# Patient Record
Sex: FEMALE | Race: BLACK OR AFRICAN AMERICAN | NOT HISPANIC OR LATINO | Employment: FULL TIME | URBAN - METROPOLITAN AREA
[De-identification: names, ages, dates, MRNs, and addresses within clinical notes are randomized per-mention and may not be internally consistent; named-entity substitution may affect disease eponyms.]

---

## 2019-05-06 ENCOUNTER — HOSPITAL ENCOUNTER (EMERGENCY)
Facility: HOSPITAL | Age: 29
Discharge: HOME | End: 2019-05-06
Attending: EMERGENCY MEDICINE
Payer: MEDICAID

## 2019-05-06 VITALS
WEIGHT: 200 LBS | BODY MASS INDEX: 32.14 KG/M2 | HEART RATE: 81 BPM | SYSTOLIC BLOOD PRESSURE: 131 MMHG | DIASTOLIC BLOOD PRESSURE: 77 MMHG | RESPIRATION RATE: 18 BRPM | TEMPERATURE: 99 F | OXYGEN SATURATION: 99 % | HEIGHT: 66 IN

## 2019-05-06 DIAGNOSIS — L30.9 DERMATITIS: Primary | ICD-10-CM

## 2019-05-06 LAB
B-HCG UR QL: NEGATIVE
CTP QC/QA: YES

## 2019-05-06 PROCEDURE — 81025 URINE PREGNANCY TEST: CPT | Mod: ER | Performed by: EMERGENCY MEDICINE

## 2019-05-06 PROCEDURE — 99284 EMERGENCY DEPT VISIT MOD MDM: CPT | Mod: ER

## 2019-05-06 RX ORDER — TRIAMCINOLONE ACETONIDE 1 MG/G
CREAM TOPICAL 2 TIMES DAILY
Qty: 15 G | Refills: 0 | Status: SHIPPED | OUTPATIENT
Start: 2019-05-06 | End: 2019-05-13

## 2019-05-06 RX ORDER — PREDNISONE 20 MG/1
40 TABLET ORAL DAILY
Qty: 10 TABLET | Refills: 0 | Status: SHIPPED | OUTPATIENT
Start: 2019-05-06 | End: 2019-05-11

## 2019-05-06 RX ORDER — CLOTRIMAZOLE 1 %
CREAM (GRAM) TOPICAL 2 TIMES DAILY
Qty: 15 G | Refills: 0 | Status: SHIPPED | OUTPATIENT
Start: 2019-05-06 | End: 2019-05-13

## 2019-05-07 NOTE — ED PROVIDER NOTES
Encounter Date: 5/6/2019       History     Chief Complaint   Patient presents with    Rash     pt c/o rash 2 days ago that is generalized. pt unsure of what she may have come into contact with. states no relief with otc meds.      29yo F with no significant pmh on file with chief complaint pruritic rash to medial thighs, to left axilla x3 days.  No fever.  No pain. She admits to temporary relief with topical and p.o. Benadryl.  Denies history of rash. Denies any trauma or wound.  No medication changes.  No new exposures.  Symptoms are acute, constant, severity 5/10.        Review of patient's allergies indicates:  No Known Allergies  History reviewed. No pertinent past medical history.  Past Surgical History:   Procedure Laterality Date    KNEE SURGERY Left      History reviewed. No pertinent family history.  Social History     Tobacco Use    Smoking status: Never Smoker   Substance Use Topics    Alcohol use: Yes    Drug use: Never     Review of Systems   Constitutional: Negative for chills and fever.   HENT: Negative for sore throat.    Eyes: Negative.  Negative for discharge and redness.   Respiratory: Negative for chest tightness and shortness of breath.    Cardiovascular: Negative for chest pain.   Gastrointestinal: Negative for abdominal pain, nausea and vomiting.   Endocrine: Negative.    Genitourinary: Negative for dysuria and flank pain.   Musculoskeletal: Negative for back pain, neck pain and neck stiffness.   Skin: Positive for rash. Negative for color change and wound.   Neurological: Negative for dizziness, weakness, light-headedness and headaches.   Hematological: Does not bruise/bleed easily.   Psychiatric/Behavioral: Negative.    All other systems reviewed and are negative.      Physical Exam     Initial Vitals [05/06/19 1828]   BP Pulse Resp Temp SpO2   128/76 79 18 98.5 °F (36.9 °C) 100 %      MAP       --         Physical Exam    Nursing note and vitals reviewed.  Constitutional: She appears  well-developed and well-nourished. She is not diaphoretic. No distress.   Well-appearing and nontoxic.  Resting comfortably on exam table.   HENT:   Head: Normocephalic and atraumatic.   Eyes: Conjunctivae and EOM are normal. Pupils are equal, round, and reactive to light.   Neck: Normal range of motion. Neck supple. No tracheal deviation present.   Cardiovascular: Intact distal pulses.   Pulmonary/Chest: Breath sounds normal. No stridor. No respiratory distress.   Abdominal: Soft. Bowel sounds are normal. She exhibits no distension. There is no tenderness.   Musculoskeletal: Normal range of motion.   Lymphadenopathy:     She has no cervical adenopathy.   Neurological: She is alert and oriented to person, place, and time.   Skin: Skin is warm and dry. Capillary refill takes less than 2 seconds.   Medial, proximal thighs with slightly erythematous, macular rash. No ttp. No vesicular lesions. There is no significant central clearance, there is no crusting to wound edges. No warmth. No induration or fluctuance. L axilla with scant, similar appearing rash.   Psychiatric: She has a normal mood and affect. Her behavior is normal. Judgment and thought content normal.         ED Course   Procedures  Labs Reviewed   POCT URINE PREGNANCY          Imaging Results    None          Medical Decision Making:   Differential Diagnosis:   Dermatitis, folliculitis, cellulitis, abscess, tinea cruris  ED Management:  I do not suspect bacterial process. I am unsure of cause, however will treat with topical corticosteroid/antifungal mixture. I've asked her to continue PO benadryl. Given out of town, no f/u, will give short course of systemic steroids given pruritus, persistent symptoms.     No history of severe allergies.  No history of anaphylaxis.  Lungs are clear, no complaints of shortness of breath. No lip or tongue swelling. I do not suspect emergent process.                      Clinical Impression:       ICD-10-CM ICD-9-CM   1.  Dermatitis L30.9 692.9         Disposition:   Disposition: Discharged  Condition: Stable                        Braeden Holly PA-C  05/06/19 1936

## 2019-05-07 NOTE — ED NOTES
Patient is here visiting from [place], and noticed a itchy, red rash to neck, arms, torso and legs 3 days ago. Has been taking oral and topical benadryl with some, but not complete relief of symptoms. Denies SOB, scratchy throat, fever, or N/V

## 2019-05-07 NOTE — DISCHARGE INSTRUCTIONS
Mix the antifungal and the steroid ointment, apply twice daily.  Do not apply to the face.  Take steroids as prescribed.  Continue with Benadryl as needed for itch.  Follow up with your primary care physician when he returns home.  Return to this ED if symptoms persist or worsen, if you experience fever, if any other problems occur.

## 2020-01-22 ENCOUNTER — TELEPHONE (OUTPATIENT)
Dept: FAMILY MEDICINE | Age: 30
End: 2020-01-22

## 2020-02-11 ENCOUNTER — TELEPHONE (OUTPATIENT)
Dept: FAMILY MEDICINE | Age: 30
End: 2020-02-11

## 2020-02-12 ENCOUNTER — OFFICE VISIT (OUTPATIENT)
Dept: FAMILY MEDICINE | Age: 30
End: 2020-02-12

## 2020-02-12 DIAGNOSIS — Z71.85 VACCINE COUNSELING: ICD-10-CM

## 2020-02-12 DIAGNOSIS — Z23 NEED FOR TDAP VACCINATION: Primary | ICD-10-CM

## 2020-02-12 PROCEDURE — 90471 IMMUNIZATION ADMIN: CPT

## 2020-02-12 PROCEDURE — 90715 TDAP VACCINE 7 YRS/> IM: CPT

## 2020-02-12 PROCEDURE — 99203 OFFICE O/P NEW LOW 30 MIN: CPT | Performed by: NURSE PRACTITIONER

## 2020-02-12 ASSESSMENT — ENCOUNTER SYMPTOMS
PSYCHIATRIC NEGATIVE: 1
RESPIRATORY NEGATIVE: 1
CONSTITUTIONAL NEGATIVE: 1

## 2020-02-12 ASSESSMENT — PAIN SCALES - GENERAL: PAINLEVEL: 0

## 2021-05-26 VITALS
SYSTOLIC BLOOD PRESSURE: 129 MMHG | HEIGHT: 66 IN | RESPIRATION RATE: 20 BRPM | WEIGHT: 221 LBS | HEART RATE: 89 BPM | TEMPERATURE: 96.7 F | DIASTOLIC BLOOD PRESSURE: 86 MMHG | BODY MASS INDEX: 35.52 KG/M2 | OXYGEN SATURATION: 100 %